# Patient Record
Sex: FEMALE
[De-identification: names, ages, dates, MRNs, and addresses within clinical notes are randomized per-mention and may not be internally consistent; named-entity substitution may affect disease eponyms.]

---

## 2018-09-26 ENCOUNTER — TRANSCRIPTION ENCOUNTER (OUTPATIENT)
Age: 83
End: 2018-09-26

## 2020-05-21 PROBLEM — Z00.00 ENCOUNTER FOR PREVENTIVE HEALTH EXAMINATION: Status: ACTIVE | Noted: 2020-05-21

## 2020-05-22 ENCOUNTER — APPOINTMENT (OUTPATIENT)
Dept: PULMONOLOGY | Facility: CLINIC | Age: 85
End: 2020-05-22
Payer: MEDICARE

## 2020-05-22 VITALS
DIASTOLIC BLOOD PRESSURE: 50 MMHG | HEART RATE: 56 BPM | TEMPERATURE: 98.2 F | OXYGEN SATURATION: 90 % | SYSTOLIC BLOOD PRESSURE: 112 MMHG

## 2020-05-22 DIAGNOSIS — Z86.79 PERSONAL HISTORY OF OTHER DISEASES OF THE CIRCULATORY SYSTEM: ICD-10-CM

## 2020-05-22 DIAGNOSIS — R06.02 SHORTNESS OF BREATH: ICD-10-CM

## 2020-05-22 DIAGNOSIS — Z95.5 PRESENCE OF CORONARY ANGIOPLASTY IMPLANT AND GRAFT: ICD-10-CM

## 2020-05-22 DIAGNOSIS — Z87.39 PERSONAL HISTORY OF OTHER DISEASES OF THE MUSCULOSKELETAL SYSTEM AND CONNECTIVE TISSUE: ICD-10-CM

## 2020-05-22 PROCEDURE — 99204 OFFICE O/P NEW MOD 45 MIN: CPT

## 2020-05-22 RX ORDER — FAMOTIDINE 20 MG/1
20 TABLET, FILM COATED ORAL
Refills: 0 | Status: ACTIVE | COMMUNITY

## 2020-05-22 RX ORDER — FUROSEMIDE 20 MG/1
20 TABLET ORAL
Refills: 0 | Status: ACTIVE | COMMUNITY

## 2020-05-22 RX ORDER — SIMVASTATIN 20 MG/1
20 TABLET, FILM COATED ORAL
Refills: 0 | Status: ACTIVE | COMMUNITY

## 2020-05-22 RX ORDER — CHLORHEXIDINE GLUCONATE 4 %
325 (65 FE) LIQUID (ML) TOPICAL
Refills: 0 | Status: ACTIVE | COMMUNITY

## 2020-05-22 RX ORDER — ALBUTEROL SULFATE 90 UG/1
108 (90 BASE) AEROSOL, METERED RESPIRATORY (INHALATION)
Refills: 0 | Status: ACTIVE | COMMUNITY

## 2020-05-22 RX ORDER — ALENDRONATE SODIUM 70 MG/1
70 TABLET ORAL
Refills: 0 | Status: ACTIVE | COMMUNITY

## 2020-05-22 RX ORDER — HYDROXYCHLOROQUINE SULFATE 200 MG/1
200 TABLET, FILM COATED ORAL
Refills: 0 | Status: ACTIVE | COMMUNITY

## 2020-05-22 RX ORDER — MIRTAZAPINE 15 MG/1
15 TABLET, FILM COATED ORAL
Refills: 0 | Status: ACTIVE | COMMUNITY

## 2020-05-22 RX ORDER — DOXYCYCLINE HYCLATE 100 MG/1
100 TABLET ORAL TWICE DAILY
Qty: 14 | Refills: 1 | Status: ACTIVE | COMMUNITY
Start: 2020-05-22 | End: 1900-01-01

## 2020-05-22 RX ORDER — LEVETIRACETAM 750 MG/1
750 TABLET, FILM COATED ORAL
Refills: 0 | Status: ACTIVE | COMMUNITY

## 2020-05-22 NOTE — REVIEW OF SYSTEMS
[Anemia] : anemia [Arthralgias] : arthralgias [Seizures] : seizures [Fever] : no fever [Chills] : no chills [Epistaxis] : no epistaxis [Dyspnea] : no dyspnea [Sputum] : no sputum [Cough] : no cough [Wheezing] : no wheezing [Chest Discomfort] : no chest discomfort [Thyroid Problem] : no thyroid problem [Diabetes] : no diabetes

## 2020-05-22 NOTE — REVIEW OF SYSTEMS
[Anemia] : anemia [Arthralgias] : arthralgias [Seizures] : seizures [Fever] : no fever [Epistaxis] : no epistaxis [Chills] : no chills [Cough] : no cough [Dyspnea] : no dyspnea [Sputum] : no sputum [Wheezing] : no wheezing [Chest Discomfort] : no chest discomfort [Diabetes] : no diabetes [Thyroid Problem] : no thyroid problem

## 2020-05-22 NOTE — DISCUSSION/SUMMARY
[FreeTextEntry1] : She is an 84 year old woman with a history of hyperlipidemia, coronary artery disease, S/P stent placement, rheumatoid arthritis who has noted to be short of breath by her daughter. She has had trouble getting consistent readings from her pulse oximeter. \par \par On examination there were some crackles and rhonchi at the lung bases. In addition there was mild dependent edema. Pulse oximetry was 91 % on room air at rest. \par \par It appears she has a mild bronchitis. Doxycycline was prescribed. The furosemide was increased to 20 mg daily for one week. A CXR will be obtained. Will call her daughter Negar when the results are back. In addition will get records from [facility]. Proper use of the pulse oximeter was demonstrated. \par \par To call me if she does not feel any better. I will see her again as needed. To follow up with Dr. Springer.

## 2020-05-22 NOTE — HISTORY OF PRESENT ILLNESS
[TextBox_4] : Referred by Dr. Adriel Springer. \par \par She is an 84 year old woman. Her daughter Negar felt that her mother was looking winded. Charlotte is in a wheelchair. Denied any cough, wheezing or SOB. No chest pain. No fever, chills or sweats. No sick contacts reported. No prior history of pulmonary disease.

## 2020-05-22 NOTE — PHYSICAL EXAM
[No Acute Distress] : no acute distress [Normal Appearance] : normal appearance [No Murmurs] : no murmurs [Normal S1, S2] : normal s1, s2 [No Resp Distress] : no resp distress [Rales] : rales [No Acc Muscle Use] : no acc muscle use [No Clubbing] : no clubbing [Deformity] : deformity [No Cyanosis] : no cyanosis [2+ Pitting] : 2+ pitting [Oriented x3] : oriented x3 [Normal Affect] : normal affect [TextBox_99] : Deformities in the hands consistent with RA

## 2020-05-22 NOTE — DISCUSSION/SUMMARY
[FreeTextEntry1] : She is an 84 year old woman with a history of hyperlipidemia, coronary artery disease, S/P stent placement, rheumatoid arthritis who has noted to be short of breath by her daughter. She has had trouble getting consistent readings from her pulse oximeter. \par \par On examination there were some crackles and rhonchi at the lung bases. In addition there was mild dependent edema. Pulse oximetry was 91 % on room air at rest. \par \par It appears she has a mild bronchitis. Doxycycline was prescribed. The furosemide was increased to 20 mg daily for one week. A CXR will be obtained. Will call her daughter Negar when the results are back. In addition will get records from [facility]. Proper use of the pulse oximeter was demonstrated. \par \par To call me if she does not feel any better. I will see her again as needed. To follow up with Dr. Springer.

## 2020-05-22 NOTE — PHYSICAL EXAM
[No Acute Distress] : no acute distress [Normal Appearance] : normal appearance [No Murmurs] : no murmurs [Normal S1, S2] : normal s1, s2 [No Resp Distress] : no resp distress [No Acc Muscle Use] : no acc muscle use [Rales] : rales [Deformity] : deformity [No Clubbing] : no clubbing [2+ Pitting] : 2+ pitting [No Cyanosis] : no cyanosis [Oriented x3] : oriented x3 [Normal Affect] : normal affect [TextBox_99] : Deformities in the hands consistent with RA

## 2020-06-05 DIAGNOSIS — J84.10 PULMONARY FIBROSIS, UNSPECIFIED: ICD-10-CM
